# Patient Record
Sex: FEMALE | Race: WHITE | NOT HISPANIC OR LATINO | URBAN - METROPOLITAN AREA
[De-identification: names, ages, dates, MRNs, and addresses within clinical notes are randomized per-mention and may not be internally consistent; named-entity substitution may affect disease eponyms.]

---

## 2017-09-07 ENCOUNTER — INPATIENT (INPATIENT)
Facility: HOSPITAL | Age: 47
LOS: 2 days | Discharge: HOME | DRG: 761 | End: 2017-09-10
Attending: OBSTETRICS & GYNECOLOGY | Admitting: OBSTETRICS & GYNECOLOGY
Payer: COMMERCIAL

## 2017-09-07 VITALS
HEART RATE: 82 BPM | DIASTOLIC BLOOD PRESSURE: 66 MMHG | HEIGHT: 63.5 IN | WEIGHT: 195.11 LBS | SYSTOLIC BLOOD PRESSURE: 141 MMHG | OXYGEN SATURATION: 100 % | RESPIRATION RATE: 18 BRPM

## 2017-09-07 DIAGNOSIS — R10.9 UNSPECIFIED ABDOMINAL PAIN: ICD-10-CM

## 2017-09-07 DIAGNOSIS — N83.209 UNSPECIFIED OVARIAN CYST, UNSPECIFIED SIDE: ICD-10-CM

## 2017-09-07 DIAGNOSIS — N83.11 CORPUS LUTEUM CYST OF RIGHT OVARY: ICD-10-CM

## 2017-09-07 DIAGNOSIS — Z98.51 TUBAL LIGATION STATUS: Chronic | ICD-10-CM

## 2017-09-07 DIAGNOSIS — K80.20 CALCULUS OF GALLBLADDER WITHOUT CHOLECYSTITIS WITHOUT OBSTRUCTION: ICD-10-CM

## 2017-09-07 DIAGNOSIS — E28.2 POLYCYSTIC OVARIAN SYNDROME: ICD-10-CM

## 2017-09-07 DIAGNOSIS — Z98.890 OTHER SPECIFIED POSTPROCEDURAL STATES: ICD-10-CM

## 2017-09-07 DIAGNOSIS — N83.511 TORSION OF RIGHT OVARY AND OVARIAN PEDICLE: ICD-10-CM

## 2017-09-07 LAB
ALBUMIN SERPL ELPH-MCNC: 4 G/DL — SIGNIFICANT CHANGE UP (ref 3.5–5)
ALP SERPL-CCNC: 66 U/L — SIGNIFICANT CHANGE UP (ref 40–120)
ALT FLD-CCNC: 18 U/L DA — SIGNIFICANT CHANGE UP (ref 10–60)
ANION GAP SERPL CALC-SCNC: 9 MMOL/L — SIGNIFICANT CHANGE UP (ref 5–17)
APPEARANCE UR: CLEAR — SIGNIFICANT CHANGE UP
AST SERPL-CCNC: 15 U/L — SIGNIFICANT CHANGE UP (ref 10–40)
BASOPHILS # BLD AUTO: 0.1 K/UL — SIGNIFICANT CHANGE UP (ref 0–0.2)
BASOPHILS NFR BLD AUTO: 0.7 % — SIGNIFICANT CHANGE UP (ref 0–2)
BILIRUB SERPL-MCNC: 0.3 MG/DL — SIGNIFICANT CHANGE UP (ref 0.2–1.2)
BILIRUB UR-MCNC: NEGATIVE — SIGNIFICANT CHANGE UP
BUN SERPL-MCNC: 15 MG/DL — SIGNIFICANT CHANGE UP (ref 7–18)
CALCIUM SERPL-MCNC: 8.6 MG/DL — SIGNIFICANT CHANGE UP (ref 8.4–10.5)
CHLORIDE SERPL-SCNC: 106 MMOL/L — SIGNIFICANT CHANGE UP (ref 96–108)
CO2 SERPL-SCNC: 22 MMOL/L — SIGNIFICANT CHANGE UP (ref 22–31)
COLOR SPEC: YELLOW — SIGNIFICANT CHANGE UP
CREAT SERPL-MCNC: 0.76 MG/DL — SIGNIFICANT CHANGE UP (ref 0.5–1.3)
DIFF PNL FLD: ABNORMAL
EOSINOPHIL # BLD AUTO: 0 K/UL — SIGNIFICANT CHANGE UP (ref 0–0.5)
EOSINOPHIL NFR BLD AUTO: 0.2 % — SIGNIFICANT CHANGE UP (ref 0–6)
GLUCOSE SERPL-MCNC: 104 MG/DL — HIGH (ref 70–99)
GLUCOSE UR QL: NEGATIVE — SIGNIFICANT CHANGE UP
HCG SERPL-ACNC: <1 MIU/ML — SIGNIFICANT CHANGE UP
HCG UR QL: NEGATIVE — SIGNIFICANT CHANGE UP
HCT VFR BLD CALC: 43.9 % — SIGNIFICANT CHANGE UP (ref 34.5–45)
HGB BLD-MCNC: 14.2 G/DL — SIGNIFICANT CHANGE UP (ref 11.5–15.5)
KETONES UR-MCNC: ABNORMAL
LEUKOCYTE ESTERASE UR-ACNC: ABNORMAL
LIDOCAIN IGE QN: 136 U/L — SIGNIFICANT CHANGE UP (ref 73–393)
LYMPHOCYTES # BLD AUTO: 1.3 K/UL — SIGNIFICANT CHANGE UP (ref 1–3.3)
LYMPHOCYTES # BLD AUTO: 8.9 % — LOW (ref 13–44)
MCHC RBC-ENTMCNC: 26.5 PG — LOW (ref 27–34)
MCHC RBC-ENTMCNC: 32.4 GM/DL — SIGNIFICANT CHANGE UP (ref 32–36)
MCV RBC AUTO: 81.8 FL — SIGNIFICANT CHANGE UP (ref 80–100)
MONOCYTES # BLD AUTO: 0.4 K/UL — SIGNIFICANT CHANGE UP (ref 0–0.9)
MONOCYTES NFR BLD AUTO: 2.8 % — SIGNIFICANT CHANGE UP (ref 2–14)
NEUTROPHILS # BLD AUTO: 13.2 K/UL — HIGH (ref 1.8–7.4)
NEUTROPHILS NFR BLD AUTO: 87.4 % — HIGH (ref 43–77)
NITRITE UR-MCNC: NEGATIVE — SIGNIFICANT CHANGE UP
PH UR: 7 — SIGNIFICANT CHANGE UP (ref 5–8)
PLATELET # BLD AUTO: 204 K/UL — SIGNIFICANT CHANGE UP (ref 150–400)
POTASSIUM SERPL-MCNC: 3.8 MMOL/L — SIGNIFICANT CHANGE UP (ref 3.5–5.3)
POTASSIUM SERPL-SCNC: 3.8 MMOL/L — SIGNIFICANT CHANGE UP (ref 3.5–5.3)
PROT SERPL-MCNC: 7.4 G/DL — SIGNIFICANT CHANGE UP (ref 6–8.3)
PROT UR-MCNC: 30 MG/DL
RBC # BLD: 5.36 M/UL — HIGH (ref 3.8–5.2)
RBC # FLD: 13.7 % — SIGNIFICANT CHANGE UP (ref 10.3–14.5)
SODIUM SERPL-SCNC: 137 MMOL/L — SIGNIFICANT CHANGE UP (ref 135–145)
SP GR SPEC: 1.01 — SIGNIFICANT CHANGE UP (ref 1.01–1.02)
UROBILINOGEN FLD QL: NEGATIVE — SIGNIFICANT CHANGE UP
WBC # BLD: 15 K/UL — HIGH (ref 3.8–10.5)
WBC # FLD AUTO: 15 K/UL — HIGH (ref 3.8–10.5)

## 2017-09-07 PROCEDURE — 99285 EMERGENCY DEPT VISIT HI MDM: CPT

## 2017-09-07 RX ORDER — ONDANSETRON 8 MG/1
4 TABLET, FILM COATED ORAL ONCE
Qty: 0 | Refills: 0 | Status: COMPLETED | OUTPATIENT
Start: 2017-09-07 | End: 2017-09-07

## 2017-09-07 RX ORDER — HYDROMORPHONE HYDROCHLORIDE 2 MG/ML
2 INJECTION INTRAMUSCULAR; INTRAVENOUS; SUBCUTANEOUS EVERY 4 HOURS
Qty: 0 | Refills: 0 | Status: DISCONTINUED | OUTPATIENT
Start: 2017-09-07 | End: 2017-09-08

## 2017-09-07 RX ORDER — MORPHINE SULFATE 50 MG/1
4 CAPSULE, EXTENDED RELEASE ORAL ONCE
Qty: 0 | Refills: 0 | Status: DISCONTINUED | OUTPATIENT
Start: 2017-09-07 | End: 2017-09-07

## 2017-09-07 RX ORDER — SODIUM CHLORIDE 9 MG/ML
1000 INJECTION, SOLUTION INTRAVENOUS
Qty: 0 | Refills: 0 | Status: DISCONTINUED | OUTPATIENT
Start: 2017-09-07 | End: 2017-09-10

## 2017-09-07 RX ORDER — SODIUM CHLORIDE 9 MG/ML
1000 INJECTION INTRAMUSCULAR; INTRAVENOUS; SUBCUTANEOUS ONCE
Qty: 0 | Refills: 0 | Status: COMPLETED | OUTPATIENT
Start: 2017-09-07 | End: 2017-09-07

## 2017-09-07 RX ORDER — FENTANYL CITRATE 50 UG/ML
50 INJECTION INTRAVENOUS ONCE
Qty: 0 | Refills: 0 | Status: DISCONTINUED | OUTPATIENT
Start: 2017-09-07 | End: 2017-09-07

## 2017-09-07 RX ORDER — SIMETHICONE 80 MG/1
80 TABLET, CHEWABLE ORAL EVERY 6 HOURS
Qty: 0 | Refills: 0 | Status: DISCONTINUED | OUTPATIENT
Start: 2017-09-07 | End: 2017-09-10

## 2017-09-07 RX ORDER — KETOROLAC TROMETHAMINE 30 MG/ML
30 SYRINGE (ML) INJECTION ONCE
Qty: 0 | Refills: 0 | Status: DISCONTINUED | OUTPATIENT
Start: 2017-09-07 | End: 2017-09-07

## 2017-09-07 RX ORDER — ACETAMINOPHEN 500 MG
1000 TABLET ORAL ONCE
Qty: 0 | Refills: 0 | Status: COMPLETED | OUTPATIENT
Start: 2017-09-07 | End: 2017-09-07

## 2017-09-07 RX ORDER — MORPHINE SULFATE 50 MG/1
6 CAPSULE, EXTENDED RELEASE ORAL ONCE
Qty: 0 | Refills: 0 | Status: DISCONTINUED | OUTPATIENT
Start: 2017-09-07 | End: 2017-09-07

## 2017-09-07 RX ORDER — IBUPROFEN 200 MG
600 TABLET ORAL EVERY 6 HOURS
Qty: 0 | Refills: 0 | Status: DISCONTINUED | OUTPATIENT
Start: 2017-09-07 | End: 2017-09-07

## 2017-09-07 RX ORDER — IBUPROFEN 200 MG
800 TABLET ORAL EVERY 6 HOURS
Qty: 0 | Refills: 0 | Status: DISCONTINUED | OUTPATIENT
Start: 2017-09-07 | End: 2017-09-08

## 2017-09-07 RX ADMIN — MORPHINE SULFATE 4 MILLIGRAM(S): 50 CAPSULE, EXTENDED RELEASE ORAL at 16:52

## 2017-09-07 RX ADMIN — Medication 30 MILLIGRAM(S): at 14:12

## 2017-09-07 RX ADMIN — ONDANSETRON 4 MILLIGRAM(S): 8 TABLET, FILM COATED ORAL at 14:37

## 2017-09-07 RX ADMIN — SIMETHICONE 80 MILLIGRAM(S): 80 TABLET, CHEWABLE ORAL at 20:21

## 2017-09-07 RX ADMIN — SODIUM CHLORIDE 125 MILLILITER(S): 9 INJECTION, SOLUTION INTRAVENOUS at 19:44

## 2017-09-07 RX ADMIN — Medication 800 MILLIGRAM(S): at 21:38

## 2017-09-07 RX ADMIN — MORPHINE SULFATE 4 MILLIGRAM(S): 50 CAPSULE, EXTENDED RELEASE ORAL at 15:02

## 2017-09-07 RX ADMIN — Medication 800 MILLIGRAM(S): at 20:38

## 2017-09-07 RX ADMIN — MORPHINE SULFATE 4 MILLIGRAM(S): 50 CAPSULE, EXTENDED RELEASE ORAL at 15:04

## 2017-09-07 RX ADMIN — MORPHINE SULFATE 4 MILLIGRAM(S): 50 CAPSULE, EXTENDED RELEASE ORAL at 14:37

## 2017-09-07 RX ADMIN — SODIUM CHLORIDE 1000 MILLILITER(S): 9 INJECTION INTRAMUSCULAR; INTRAVENOUS; SUBCUTANEOUS at 14:12

## 2017-09-07 RX ADMIN — FENTANYL CITRATE 50 MICROGRAM(S): 50 INJECTION INTRAVENOUS at 18:03

## 2017-09-07 RX ADMIN — MORPHINE SULFATE 6 MILLIGRAM(S): 50 CAPSULE, EXTENDED RELEASE ORAL at 16:56

## 2017-09-07 RX ADMIN — SODIUM CHLORIDE 1000 MILLILITER(S): 9 INJECTION INTRAMUSCULAR; INTRAVENOUS; SUBCUTANEOUS at 18:03

## 2017-09-07 RX ADMIN — Medication 30 MILLIGRAM(S): at 15:04

## 2017-09-07 RX ADMIN — Medication 400 MILLIGRAM(S): at 19:41

## 2017-09-07 RX ADMIN — FENTANYL CITRATE 50 MICROGRAM(S): 50 INJECTION INTRAVENOUS at 17:33

## 2017-09-07 RX ADMIN — Medication 1000 MILLIGRAM(S): at 20:11

## 2017-09-07 RX ADMIN — MORPHINE SULFATE 6 MILLIGRAM(S): 50 CAPSULE, EXTENDED RELEASE ORAL at 17:26

## 2017-09-07 NOTE — H&P ADULT - HISTORY OF PRESENT ILLNESS
45yo female with h/o PCOS presents to ED c/o sharp sudden onset of RLQ pain since this morning.  States the pain is 10/10 and radiating to suprapubic area and left side and back.  Since arrival in ED, only temporary relief with morphine or toradol  Denies fever, chills, epigastric pain, nausea/vomiting, diarrhea, constipation.  Denies vaginal bleeding or discharge.    PMH: PCOS  PSH: CS x3  POBHx; CSx3  Pgyn: +PCOS not on meds, has private gyn in [place], seen in the past 6 months  Meds: none  Allergies: denies    PE: Pt appears uncomfortable, VSS  Abd: soft, NT/ND; no guarding or rebound, no acute abdomen observed however pt had recieved multiple doses of morphine/toradol  Ext: soft NT/ no edema  SVE: deferred    CBC 15/14.2/43.9/204  UA:Urine Microscopic-Add On (NC) (09.07.17 @ 14:30)    Bacteria: Many /HPF    White Blood Cell - Urine: 3-5 /HPF    Red Blood Cell - Urine: 10-25 /HPF    Comment - Urine: many mucous threads    Epithelial Cells: Moderate    d/w Dr. Chester

## 2017-09-07 NOTE — ED PROVIDER NOTE - CHPI ED SYMPTOMS NEG
no fever, no chills, no shortness of breath, no cough, no chest pain, no palpitations, no vomiting, no diarrhea, no dysuria, no urinary frequency, no hematuria, no vaginal discharge, no vaginal bleeding

## 2017-09-07 NOTE — ED PROVIDER NOTE - OBJECTIVE STATEMENT
45 y/o F pt with PMHx of known gallstones (has been asymptomatic), and PSHx Tubal ligation, presents to ED c/o sudden onset of sharp constant right lower abd pain that radiates to her back x this morning 0900. Pt endorses associating sx of nausea. Pt denies fever, chills, shortness of breath, cough, chest pain, palpitations, vomiting, diarrhea, dysuria, urinary frequency, hematuria, vaginal discharge, vaginal bleeding, or any other complaints. NKDA. LMP: 3 weeks.

## 2017-09-07 NOTE — ED PROVIDER NOTE - PROGRESS NOTE DETAILS
patient still with significant right lower abdominal pain. Rt ovary not visualized. high suspicion for ovarian torsion. GYN consulted. awaiting CT abdomen to r/o apendicitis. FRANKY: Pt was signed out to me pending CT abd to r/o appendicitis vs ovarian pathology. CT shows no appendicitis. Free fluid in pelvis and upper ab, possible rupture cyst. Pain improved with fentanyl, now 1/10. Pending OBGYN consult and recs. FRANKY: OBGYN will admit pt for pain control. Dr DONNY Nicholson

## 2017-09-08 DIAGNOSIS — R10.9 UNSPECIFIED ABDOMINAL PAIN: ICD-10-CM

## 2017-09-08 DIAGNOSIS — K80.20 CALCULUS OF GALLBLADDER WITHOUT CHOLECYSTITIS WITHOUT OBSTRUCTION: ICD-10-CM

## 2017-09-08 LAB
BASOPHILS # BLD AUTO: 0 K/UL — SIGNIFICANT CHANGE UP (ref 0–0.2)
BASOPHILS # BLD AUTO: 0.1 K/UL — SIGNIFICANT CHANGE UP (ref 0–0.2)
BASOPHILS NFR BLD AUTO: 0.4 % — SIGNIFICANT CHANGE UP (ref 0–2)
BASOPHILS NFR BLD AUTO: 0.5 % — SIGNIFICANT CHANGE UP (ref 0–2)
EOSINOPHIL # BLD AUTO: 0 K/UL — SIGNIFICANT CHANGE UP (ref 0–0.5)
EOSINOPHIL # BLD AUTO: 0 K/UL — SIGNIFICANT CHANGE UP (ref 0–0.5)
EOSINOPHIL NFR BLD AUTO: 0.1 % — SIGNIFICANT CHANGE UP (ref 0–6)
EOSINOPHIL NFR BLD AUTO: 0.2 % — SIGNIFICANT CHANGE UP (ref 0–6)
HCT VFR BLD CALC: 19.8 % — CRITICAL LOW (ref 34.5–45)
HCT VFR BLD CALC: 31.7 % — LOW (ref 34.5–45)
HCT VFR BLD CALC: 33 % — LOW (ref 34.5–45)
HGB BLD-MCNC: 10.4 G/DL — LOW (ref 11.5–15.5)
HGB BLD-MCNC: 11 G/DL — LOW (ref 11.5–15.5)
HGB BLD-MCNC: 7 G/DL — CRITICAL LOW (ref 11.5–15.5)
LYMPHOCYTES # BLD AUTO: 1.2 K/UL — SIGNIFICANT CHANGE UP (ref 1–3.3)
LYMPHOCYTES # BLD AUTO: 1.7 K/UL — SIGNIFICANT CHANGE UP (ref 1–3.3)
LYMPHOCYTES # BLD AUTO: 11.1 % — LOW (ref 13–44)
LYMPHOCYTES # BLD AUTO: 11.8 % — LOW (ref 13–44)
MCHC RBC-ENTMCNC: 26.3 PG — LOW (ref 27–34)
MCHC RBC-ENTMCNC: 27.1 PG — SIGNIFICANT CHANGE UP (ref 27–34)
MCHC RBC-ENTMCNC: 29.6 PG — SIGNIFICANT CHANGE UP (ref 27–34)
MCHC RBC-ENTMCNC: 32.7 GM/DL — SIGNIFICANT CHANGE UP (ref 32–36)
MCHC RBC-ENTMCNC: 33.4 GM/DL — SIGNIFICANT CHANGE UP (ref 32–36)
MCHC RBC-ENTMCNC: 35.2 GM/DL — SIGNIFICANT CHANGE UP (ref 32–36)
MCV RBC AUTO: 80.4 FL — SIGNIFICANT CHANGE UP (ref 80–100)
MCV RBC AUTO: 81.3 FL — SIGNIFICANT CHANGE UP (ref 80–100)
MCV RBC AUTO: 84.2 FL — SIGNIFICANT CHANGE UP (ref 80–100)
MONOCYTES # BLD AUTO: 0.7 K/UL — SIGNIFICANT CHANGE UP (ref 0–0.9)
MONOCYTES # BLD AUTO: 0.8 K/UL — SIGNIFICANT CHANGE UP (ref 0–0.9)
MONOCYTES NFR BLD AUTO: 5.1 % — SIGNIFICANT CHANGE UP (ref 2–14)
MONOCYTES NFR BLD AUTO: 6.9 % — SIGNIFICANT CHANGE UP (ref 2–14)
NEUTROPHILS # BLD AUTO: 12.5 K/UL — HIGH (ref 1.8–7.4)
NEUTROPHILS # BLD AUTO: 7.9 K/UL — HIGH (ref 1.8–7.4)
NEUTROPHILS NFR BLD AUTO: 80.6 % — HIGH (ref 43–77)
NEUTROPHILS NFR BLD AUTO: 83.4 % — HIGH (ref 43–77)
PLATELET # BLD AUTO: 178 K/UL — SIGNIFICANT CHANGE UP (ref 150–400)
PLATELET # BLD AUTO: 182 K/UL — SIGNIFICANT CHANGE UP (ref 150–400)
PLATELET # BLD AUTO: 186 K/UL — SIGNIFICANT CHANGE UP (ref 150–400)
RBC # BLD: 2.35 M/UL — LOW (ref 3.8–5.2)
RBC # BLD: 3.95 M/UL — SIGNIFICANT CHANGE UP (ref 3.8–5.2)
RBC # BLD: 4.05 M/UL — SIGNIFICANT CHANGE UP (ref 3.8–5.2)
RBC # FLD: 11.3 % — SIGNIFICANT CHANGE UP (ref 10.3–14.5)
RBC # FLD: 13.7 % — SIGNIFICANT CHANGE UP (ref 10.3–14.5)
RBC # FLD: 14.2 % — SIGNIFICANT CHANGE UP (ref 10.3–14.5)
WBC # BLD: 15 K/UL — HIGH (ref 3.8–10.5)
WBC # BLD: 17 K/UL — HIGH (ref 3.8–10.5)
WBC # BLD: 9.8 K/UL — SIGNIFICANT CHANGE UP (ref 3.8–10.5)
WBC # FLD AUTO: 15 K/UL — HIGH (ref 3.8–10.5)
WBC # FLD AUTO: 17 K/UL — HIGH (ref 3.8–10.5)
WBC # FLD AUTO: 9.8 K/UL — SIGNIFICANT CHANGE UP (ref 3.8–10.5)

## 2017-09-08 PROCEDURE — 76705 ECHO EXAM OF ABDOMEN: CPT | Mod: 26

## 2017-09-08 RX ORDER — HYDROMORPHONE HYDROCHLORIDE 2 MG/ML
1 INJECTION INTRAMUSCULAR; INTRAVENOUS; SUBCUTANEOUS EVERY 4 HOURS
Qty: 0 | Refills: 0 | Status: DISCONTINUED | OUTPATIENT
Start: 2017-09-08 | End: 2017-09-10

## 2017-09-08 RX ORDER — KETOROLAC TROMETHAMINE 30 MG/ML
30 SYRINGE (ML) INJECTION EVERY 6 HOURS
Qty: 0 | Refills: 0 | Status: DISCONTINUED | OUTPATIENT
Start: 2017-09-08 | End: 2017-09-09

## 2017-09-08 RX ORDER — ACETAMINOPHEN 500 MG
1000 TABLET ORAL ONCE
Qty: 0 | Refills: 0 | Status: COMPLETED | OUTPATIENT
Start: 2017-09-08 | End: 2017-09-09

## 2017-09-08 RX ORDER — ONDANSETRON 8 MG/1
4 TABLET, FILM COATED ORAL EVERY 6 HOURS
Qty: 0 | Refills: 0 | Status: COMPLETED | OUTPATIENT
Start: 2017-09-08 | End: 2017-09-08

## 2017-09-08 RX ORDER — KETOROLAC TROMETHAMINE 30 MG/ML
30 SYRINGE (ML) INJECTION EVERY 6 HOURS
Qty: 0 | Refills: 0 | Status: DISCONTINUED | OUTPATIENT
Start: 2017-09-08 | End: 2017-09-08

## 2017-09-08 RX ORDER — HYDROMORPHONE HYDROCHLORIDE 2 MG/ML
0.5 INJECTION INTRAMUSCULAR; INTRAVENOUS; SUBCUTANEOUS
Qty: 0 | Refills: 0 | Status: DISCONTINUED | OUTPATIENT
Start: 2017-09-08 | End: 2017-09-09

## 2017-09-08 RX ORDER — HYDROMORPHONE HYDROCHLORIDE 2 MG/ML
1 INJECTION INTRAMUSCULAR; INTRAVENOUS; SUBCUTANEOUS EVERY 4 HOURS
Qty: 0 | Refills: 0 | Status: DISCONTINUED | OUTPATIENT
Start: 2017-09-08 | End: 2017-09-08

## 2017-09-08 RX ADMIN — ONDANSETRON 4 MILLIGRAM(S): 8 TABLET, FILM COATED ORAL at 03:27

## 2017-09-08 RX ADMIN — HYDROMORPHONE HYDROCHLORIDE 1 MILLIGRAM(S): 2 INJECTION INTRAMUSCULAR; INTRAVENOUS; SUBCUTANEOUS at 06:45

## 2017-09-08 RX ADMIN — HYDROMORPHONE HYDROCHLORIDE 1 MILLIGRAM(S): 2 INJECTION INTRAMUSCULAR; INTRAVENOUS; SUBCUTANEOUS at 02:34

## 2017-09-08 RX ADMIN — HYDROMORPHONE HYDROCHLORIDE 1 MILLIGRAM(S): 2 INJECTION INTRAMUSCULAR; INTRAVENOUS; SUBCUTANEOUS at 15:07

## 2017-09-08 RX ADMIN — Medication 30 MILLIGRAM(S): at 12:17

## 2017-09-08 RX ADMIN — HYDROMORPHONE HYDROCHLORIDE 1 MILLIGRAM(S): 2 INJECTION INTRAMUSCULAR; INTRAVENOUS; SUBCUTANEOUS at 15:45

## 2017-09-08 RX ADMIN — SIMETHICONE 80 MILLIGRAM(S): 80 TABLET, CHEWABLE ORAL at 00:13

## 2017-09-08 RX ADMIN — Medication 30 MILLIGRAM(S): at 01:43

## 2017-09-08 RX ADMIN — HYDROMORPHONE HYDROCHLORIDE 1 MILLIGRAM(S): 2 INJECTION INTRAMUSCULAR; INTRAVENOUS; SUBCUTANEOUS at 03:00

## 2017-09-08 RX ADMIN — HYDROMORPHONE HYDROCHLORIDE 1 MILLIGRAM(S): 2 INJECTION INTRAMUSCULAR; INTRAVENOUS; SUBCUTANEOUS at 07:00

## 2017-09-08 RX ADMIN — Medication 30 MILLIGRAM(S): at 01:57

## 2017-09-08 RX ADMIN — SODIUM CHLORIDE 125 MILLILITER(S): 9 INJECTION, SOLUTION INTRAVENOUS at 06:26

## 2017-09-08 RX ADMIN — SIMETHICONE 80 MILLIGRAM(S): 80 TABLET, CHEWABLE ORAL at 11:21

## 2017-09-08 RX ADMIN — SIMETHICONE 80 MILLIGRAM(S): 80 TABLET, CHEWABLE ORAL at 05:26

## 2017-09-08 RX ADMIN — Medication 30 MILLIGRAM(S): at 21:12

## 2017-09-08 RX ADMIN — Medication 30 MILLIGRAM(S): at 12:50

## 2017-09-08 RX ADMIN — SIMETHICONE 80 MILLIGRAM(S): 80 TABLET, CHEWABLE ORAL at 17:18

## 2017-09-08 NOTE — CHART NOTE - NSCHARTNOTEFT_GEN_A_CORE
Pt seen at bedside, still c/o of 8/10 RLQ pain, Denies chest pain, SOB, palpitations, headache, dizziness, weakness, n/v, fever or chills.   VSS  T(C): 36.4 (09-08-17 @ 01:18), Max: 37.1 (09-08-17 @ 00:31)  HR: 88 (09-08-17 @ 01:18) (82 - 89)  BP: 119/61 (09-08-17 @ 01:18) (108/41 - 141/66)  RR: 17 (09-08-17 @ 01:18) (16 - 18)  SpO2: 99% (09-08-17 @ 01:18) (99% - 100%)    gen: NAD, a&ox3  cardiac: RRR  abd: soft, non distended, +RLQ tenderness, no rebound, no guarding  pelvic: no vaginal bleeding                        7.0    9.8   )-----------( 178      ( 08 Sep 2017 02:22 )             19.8       a/p HD 1, admitted for RLQ pain likely ruptured right ovarian cyst  - repeat cbc & type & screen   - 2 units of PRBC ordered  - patient added on for the OR for first case in AM  d/w Dr. Chester Pt seen at bedside, still c/o of 8/10 RLQ pain, Denies chest pain, SOB, palpitations, headache, dizziness, weakness, n/v, fever or chills.   VSS  T(C): 36.4 (09-08-17 @ 01:18), Max: 37.1 (09-08-17 @ 00:31)  HR: 88 (09-08-17 @ 01:18) (82 - 89)  BP: 119/61 (09-08-17 @ 01:18) (108/41 - 141/66)  RR: 17 (09-08-17 @ 01:18) (16 - 18)  SpO2: 99% (09-08-17 @ 01:18) (99% - 100%)    gen: NAD, a&ox3  cardiac: RRR  abd: soft, non distended, +RLQ tenderness, no rebound, no guarding  pelvic: no vaginal bleeding                        7.0    9.8   )-----------( 178      ( 08 Sep 2017 02:22 )             19.8       a/p HD 1, admitted for RLQ pain likely ruptured right ovarian cyst  - repeat cbc & type & screen   - continue close monitoring  d/w Dr. Chester    *Addendum  CBC at 2:22 was drawn from the same arm as where the IV is  repeat cbc:                        11.0   17.0  )-----------( 182      ( 08 Sep 2017 03:37 )             33.0    repeat cbc at 6am  does not require any blood transfusion at this moment  will continue to monitor  recommend RUQ ultrasound     d/w Dr. Chester

## 2017-09-08 NOTE — PROGRESS NOTE ADULT - SUBJECTIVE AND OBJECTIVE BOX
d/w  Patient evaluated at bedside, states she continues to have 8/10 pain, though the medications "take the edge off". Currently NPO for possible procedure and sonogram.   Voiding without diffficulty; +flatus, -BM since admission  Denies fever/chills, nausea/vomiting, headache, dizziness, chest pains, shortness of breath, palpitations    Vital Signs Last 24 Hrs  T(C): 36.8 (08 Sep 2017 05:53), Max: 37.1 (08 Sep 2017 00:31)  T(F): 98.3 (08 Sep 2017 05:53), Max: 98.7 (08 Sep 2017 00:31)  HR: 88 (08 Sep 2017 05:53) (82 - 89)  BP: 112/59 (08 Sep 2017 05:53) (108/41 - 141/66)  BP(mean): --  RR: 16 (08 Sep 2017 05:53) (16 - 18)  SpO2: 97% (08 Sep 2017 05:53) (97% - 100%)    PE: Pt laying in bed, NAD, AAOx3  Chest: CTA bilaterally, no W/R/R  Cardiac: RRR  Abd: soft; NT; no guarding or rebound; +BS x4 quad;   Gyn: no VB  Ext: soft, NT; DTRs 2+ bilaterally; no worsening edema                          11.0   17.0  )-----------( 182      ( 08 Sep 2017 03:37 )             33.0     09-07    137  |  106  |  15  ----------------------------<  104<H>  3.8   |  22  |  0.76    Ca    8.6      07 Sep 2017 14:09    TPro  7.4  /  Alb  4.0  /  TBili  0.3  /  DBili  x   /  AST  15  /  ALT  18  /  AlkPhos  66  09-07      D/w Dr. Rajput

## 2017-09-08 NOTE — CHART NOTE - NSCHARTNOTEFT_GEN_A_CORE
Multiple attempts made to contact Dr. Rowland to discuss laboratory results and patient status. Message left at 920am and 1140am.

## 2017-09-08 NOTE — CONSULT NOTE ADULT - SUBJECTIVE AND OBJECTIVE BOX
Surgery:  Consultation Note    Patient is a 46y old  Female who presents with a chief complaint of right lower quadrant pain since this morning (07 Sep 2017 19:11)      HPI:  45 y/o female with PMH PCOS, PSH tubal ligation c/o severe sudden onset RLQ pain that started yesterday morning prior to which she had coffee and a light breakfast. Pt states the pain began as a dull ache in her suprapubic area which progressed to sharp stabbing pain that was centralized to the R abdomen and radiating to the b/l flank, lower back and R shoulder. Pt c/o pelvic pain with urinating. Pt rates the pain as over a 10/10 in severity and about a 2/10 after pain medications. Pt states that pain is slightly improved today and more colicky in nature, however still severe at times without pain medication. Pt admits to several episodes of diaphoresis and dizziness since the onset of symptoms. Admits to feeling nauseous but denies any vomiting, diarrhea, constipation (last BM yesterday), fever, SOB, palpitations, dysuria, frequency, hematuria, foul smelling urine. Pt was diagnosed with 7mm gallstone 2 yrs ago which she has never received treatment for and has also been asymptomatic.  Pt's LMP was ~ 3 wks ago and she is not currently sexually active.     PAST MEDICAL & SURGICAL HISTORY:  PCOS (polycystic ovarian syndrome)  Cholelithiasis   delivery delivered  H/O tubal ligation    Allergies    No Known Allergies    Intolerances    MEDICATIONS  (STANDING):  lactated ringers. 1000 milliLiter(s) (125 mL/Hr) IV Continuous <Continuous>  simethicone 80 milliGRAM(s) Chew every 6 hours    MEDICATIONS  (PRN):  HYDROmorphone  Injectable 1 milliGRAM(s) IV Push every 4 hours PRN Severe Pain (7 - 10)  ketorolac   Injectable 30 milliGRAM(s) IV Push every 6 hours PRN Moderate Pain (4 - 6)      Vital Signs Last 24 Hrs  T(C): 36.8 (08 Sep 2017 05:53), Max: 37.1 (08 Sep 2017 00:31)  T(F): 98.3 (08 Sep 2017 05:53), Max: 98.7 (08 Sep 2017 00:31)  HR: 88 (08 Sep 2017 05:53) (82 - 89)  BP: 112/59 (08 Sep 2017 05:53) (108/41 - 132/63)  BP(mean): --  RR: 16 (08 Sep 2017 05:53) (16 - 18)  SpO2: 97% (08 Sep 2017 05:53) (97% - 100%)    Physical Exam:    Gen:  AAO x 3, laying comfortably in bed in no acute distress  Abd: + distended, +BS in all 4 quadrants,  soft, +tenderness to palpation in all 4 quadrants, more severe in RUQ and RLQ, neg Bar's sign, no guarding, rigidity  Back: no CVA tenderness b/l      Labs:                          10.4   15.0  )-----------( 186      ( 08 Sep 2017 08:52 )             31.7         137  |  106  |  15  ----------------------------<  104<H>  3.8   |  22  |  0.76    Ca    8.6      07 Sep 2017 14:09    TPro  7.4  /  Alb  4.0  /  TBili  0.3  /  DBili  x   /  AST  15  /  ALT  18  /  AlkPhos  66        Urinalysis Basic - ( 07 Sep 2017 14:30 )    Color: Yellow / Appearance: Clear / S.010 / pH: x  Gluc: x / Ketone: Small  / Bili: Negative / Urobili: Negative   Blood: x / Protein: 30 mg/dL / Nitrite: Negative   Leuk Esterase: Trace / RBC: 10-25 /HPF / WBC 3-5 /HPF   Sq Epi: x / Non Sq Epi: Moderate / Bacteria: Many /HPF      Radiological Exams:  < from: CT Abdomen and Pelvis w/ Oral Cont and w/ IV Cont (17 @ 17:55) >    EXAM:  CT ABDOMEN AND PELVIS OC IC                            PROCEDURE DATE:  2017          INTERPRETATION:  CT ABDOMEN AND PELVIS WITH IV CONTRAST    CLINICAL STATEMENT: RLQ pain.    COMPARISON: None.    TECHNIQUE: CT scan of the abdomen and pelvis was performed with IV,   without oral contrast. Multiplanar reformations were made.    FINDINGS:  Mild bibasilar subsegmental atelectasis. No pleural effusion.    There is mild complex ascites in both upper quadrants, tracking down the   paracolic gutters into the pelvis.    The liver, gallbladder, pancreas, spleen, adrenal glands and kidneys are   unremarkable.    Evaluation of the GI tract is limited without oral contrast. GI tract is   unobstructed. Appendix is unremarkable. No free air.    No bulky adenopathy. Normal caliber abdominal aorta.    Urinary bladder is unremarkable. Uterus appears unremarkable. Left adnexa   is heterogeneous and enlarged. Right adnexa also appears heterogeneous.   Small amount of complex free pelvic fluid.    Degenerative changes in the spine.    IMPRESSION:  No CT evidence of appendicitis.    Heterogeneous, enlarged left adnexa. Right adnexa also appears   heterogeneous. Small amount of complex free pelvic fluid, and mild   complex ascites in the upper abdomen. Diagnostic considerations include   rupture of an ovarian hemorrhagic cyst; clinical correlation is   recommended.                MELVA TAVARES M.D., ATTENDING RADIOLOGIST  This document has been electronically signed. Sep  7 2017  6:02PM              < end of copied text >    < from: US Hepatic & Pancreatic (17 @ 09:23) >  EXAM:  US LIVER AND PANCREAS                            PROCEDURE DATE:  2017          INTERPRETATION:  EXAM: US LIVER AND PANCREAS  INDICATION: abdominal pain hx of gallstones.  COMPARISON: None.    TECHNIQUE: Grayscale ultrasound of the right upper quadrant was performed.    FINDINGS:  Liver: Normal echogenicity and echotexture. No focal hepatic mass is   demonstrated. Measures 18.1 cm in craniocaudal span. Portal and hepatic   veins are patent.    Bile ducts: No intra or extrahepatic biliary ductal dilatation. Common   duct = 7 mm, top normal in size.    Gallbladder: 1.7 cm gallstone. No pericholecystic fluid, or gallbladder   wall thickening. No sonographic Bar's sign was elicited by the   sonographer.    Pancreas: Visualized pancreatic head is unremarkable. The pancreatic body   and tail are obscured by bowel gas.     Right kidney: Measures 11.1 x 4.3 x 4.3 cm. No hydronephrosis.    Peritoneum: No ascites.    Proximal Aorta & IVC: Visualized abdominal aorta and IVC are grossly   unremarkable.    IMPRESSION:   Cholelithiasis. No sonographic evidence of acute cholecystitis.      < end of copied text >

## 2017-09-08 NOTE — PROGRESS NOTE ADULT - ASSESSMENT
47yo female admitted for pain management suspected ruptured ovarian cyst vs cholelithiasis, leukocytosis

## 2017-09-09 DIAGNOSIS — K80.20 CALCULUS OF GALLBLADDER WITHOUT CHOLECYSTITIS WITHOUT OBSTRUCTION: ICD-10-CM

## 2017-09-09 LAB
ANION GAP SERPL CALC-SCNC: 6 MMOL/L — SIGNIFICANT CHANGE UP (ref 5–17)
BASOPHILS # BLD AUTO: 0.1 K/UL — SIGNIFICANT CHANGE UP (ref 0–0.2)
BASOPHILS # BLD AUTO: 0.1 K/UL — SIGNIFICANT CHANGE UP (ref 0–0.2)
BASOPHILS NFR BLD AUTO: 0.6 % — SIGNIFICANT CHANGE UP (ref 0–2)
BASOPHILS NFR BLD AUTO: 0.6 % — SIGNIFICANT CHANGE UP (ref 0–2)
BUN SERPL-MCNC: 12 MG/DL — SIGNIFICANT CHANGE UP (ref 7–18)
CALCIUM SERPL-MCNC: 7.7 MG/DL — LOW (ref 8.4–10.5)
CHLORIDE SERPL-SCNC: 110 MMOL/L — HIGH (ref 96–108)
CO2 SERPL-SCNC: 27 MMOL/L — SIGNIFICANT CHANGE UP (ref 22–31)
CREAT SERPL-MCNC: 0.62 MG/DL — SIGNIFICANT CHANGE UP (ref 0.5–1.3)
EOSINOPHIL # BLD AUTO: 0.1 K/UL — SIGNIFICANT CHANGE UP (ref 0–0.5)
EOSINOPHIL # BLD AUTO: 0.1 K/UL — SIGNIFICANT CHANGE UP (ref 0–0.5)
EOSINOPHIL NFR BLD AUTO: 0.4 % — SIGNIFICANT CHANGE UP (ref 0–6)
EOSINOPHIL NFR BLD AUTO: 0.7 % — SIGNIFICANT CHANGE UP (ref 0–6)
GLUCOSE SERPL-MCNC: 98 MG/DL — SIGNIFICANT CHANGE UP (ref 70–99)
HCT VFR BLD CALC: 26.5 % — LOW (ref 34.5–45)
HCT VFR BLD CALC: 26.8 % — LOW (ref 34.5–45)
HCT VFR BLD CALC: 28 % — LOW (ref 34.5–45)
HGB BLD-MCNC: 8.5 G/DL — LOW (ref 11.5–15.5)
HGB BLD-MCNC: 8.5 G/DL — LOW (ref 11.5–15.5)
HGB BLD-MCNC: 8.9 G/DL — LOW (ref 11.5–15.5)
LYMPHOCYTES # BLD AUTO: 1.9 K/UL — SIGNIFICANT CHANGE UP (ref 1–3.3)
LYMPHOCYTES # BLD AUTO: 15.6 % — SIGNIFICANT CHANGE UP (ref 13–44)
LYMPHOCYTES # BLD AUTO: 15.9 % — SIGNIFICANT CHANGE UP (ref 13–44)
LYMPHOCYTES # BLD AUTO: 2.1 K/UL — SIGNIFICANT CHANGE UP (ref 1–3.3)
MCHC RBC-ENTMCNC: 25.8 PG — LOW (ref 27–34)
MCHC RBC-ENTMCNC: 25.9 PG — LOW (ref 27–34)
MCHC RBC-ENTMCNC: 26 PG — LOW (ref 27–34)
MCHC RBC-ENTMCNC: 31.8 GM/DL — LOW (ref 32–36)
MCHC RBC-ENTMCNC: 31.8 GM/DL — LOW (ref 32–36)
MCHC RBC-ENTMCNC: 31.9 GM/DL — LOW (ref 32–36)
MCV RBC AUTO: 81 FL — SIGNIFICANT CHANGE UP (ref 80–100)
MCV RBC AUTO: 81.4 FL — SIGNIFICANT CHANGE UP (ref 80–100)
MCV RBC AUTO: 81.6 FL — SIGNIFICANT CHANGE UP (ref 80–100)
MONOCYTES # BLD AUTO: 0.9 K/UL — SIGNIFICANT CHANGE UP (ref 0–0.9)
MONOCYTES # BLD AUTO: 0.9 K/UL — SIGNIFICANT CHANGE UP (ref 0–0.9)
MONOCYTES NFR BLD AUTO: 7.1 % — SIGNIFICANT CHANGE UP (ref 2–14)
MONOCYTES NFR BLD AUTO: 7.7 % — SIGNIFICANT CHANGE UP (ref 2–14)
NEUTROPHILS # BLD AUTO: 10.2 K/UL — HIGH (ref 1.8–7.4)
NEUTROPHILS # BLD AUTO: 8.9 K/UL — HIGH (ref 1.8–7.4)
NEUTROPHILS NFR BLD AUTO: 75.1 % — SIGNIFICANT CHANGE UP (ref 43–77)
NEUTROPHILS NFR BLD AUTO: 76.3 % — SIGNIFICANT CHANGE UP (ref 43–77)
PLATELET # BLD AUTO: 148 K/UL — LOW (ref 150–400)
PLATELET # BLD AUTO: 149 K/UL — LOW (ref 150–400)
PLATELET # BLD AUTO: 177 K/UL — SIGNIFICANT CHANGE UP (ref 150–400)
POTASSIUM SERPL-MCNC: 3.6 MMOL/L — SIGNIFICANT CHANGE UP (ref 3.5–5.3)
POTASSIUM SERPL-SCNC: 3.6 MMOL/L — SIGNIFICANT CHANGE UP (ref 3.5–5.3)
RBC # BLD: 3.26 M/UL — LOW (ref 3.8–5.2)
RBC # BLD: 3.3 M/UL — LOW (ref 3.8–5.2)
RBC # BLD: 3.45 M/UL — LOW (ref 3.8–5.2)
RBC # FLD: 14.2 % — SIGNIFICANT CHANGE UP (ref 10.3–14.5)
RBC # FLD: 14.3 % — SIGNIFICANT CHANGE UP (ref 10.3–14.5)
RBC # FLD: 14.5 % — SIGNIFICANT CHANGE UP (ref 10.3–14.5)
SODIUM SERPL-SCNC: 143 MMOL/L — SIGNIFICANT CHANGE UP (ref 135–145)
WBC # BLD: 10.9 K/UL — HIGH (ref 3.8–10.5)
WBC # BLD: 11.8 K/UL — HIGH (ref 3.8–10.5)
WBC # BLD: 13.3 K/UL — HIGH (ref 3.8–10.5)
WBC # FLD AUTO: 10.9 K/UL — HIGH (ref 3.8–10.5)
WBC # FLD AUTO: 11.8 K/UL — HIGH (ref 3.8–10.5)
WBC # FLD AUTO: 13.3 K/UL — HIGH (ref 3.8–10.5)

## 2017-09-09 RX ORDER — OXYCODONE AND ACETAMINOPHEN 5; 325 MG/1; MG/1
2 TABLET ORAL EVERY 6 HOURS
Qty: 0 | Refills: 0 | Status: DISCONTINUED | OUTPATIENT
Start: 2017-09-09 | End: 2017-09-10

## 2017-09-09 RX ORDER — OXYCODONE AND ACETAMINOPHEN 5; 325 MG/1; MG/1
1 TABLET ORAL EVERY 4 HOURS
Qty: 0 | Refills: 0 | Status: DISCONTINUED | OUTPATIENT
Start: 2017-09-09 | End: 2017-09-10

## 2017-09-09 RX ADMIN — HYDROMORPHONE HYDROCHLORIDE 1 MILLIGRAM(S): 2 INJECTION INTRAMUSCULAR; INTRAVENOUS; SUBCUTANEOUS at 11:00

## 2017-09-09 RX ADMIN — SIMETHICONE 80 MILLIGRAM(S): 80 TABLET, CHEWABLE ORAL at 06:00

## 2017-09-09 RX ADMIN — SIMETHICONE 80 MILLIGRAM(S): 80 TABLET, CHEWABLE ORAL at 00:00

## 2017-09-09 RX ADMIN — SODIUM CHLORIDE 125 MILLILITER(S): 9 INJECTION, SOLUTION INTRAVENOUS at 23:35

## 2017-09-09 RX ADMIN — OXYCODONE AND ACETAMINOPHEN 2 TABLET(S): 5; 325 TABLET ORAL at 18:40

## 2017-09-09 RX ADMIN — SIMETHICONE 80 MILLIGRAM(S): 80 TABLET, CHEWABLE ORAL at 23:35

## 2017-09-09 RX ADMIN — SIMETHICONE 80 MILLIGRAM(S): 80 TABLET, CHEWABLE ORAL at 17:16

## 2017-09-09 RX ADMIN — HYDROMORPHONE HYDROCHLORIDE 1 MILLIGRAM(S): 2 INJECTION INTRAMUSCULAR; INTRAVENOUS; SUBCUTANEOUS at 11:30

## 2017-09-09 RX ADMIN — SIMETHICONE 80 MILLIGRAM(S): 80 TABLET, CHEWABLE ORAL at 11:51

## 2017-09-09 RX ADMIN — OXYCODONE AND ACETAMINOPHEN 2 TABLET(S): 5; 325 TABLET ORAL at 19:09

## 2017-09-09 RX ADMIN — OXYCODONE AND ACETAMINOPHEN 1 TABLET(S): 5; 325 TABLET ORAL at 23:35

## 2017-09-09 RX ADMIN — Medication 1000 MILLIGRAM(S): at 10:00

## 2017-09-09 RX ADMIN — SODIUM CHLORIDE 125 MILLILITER(S): 9 INJECTION, SOLUTION INTRAVENOUS at 06:00

## 2017-09-09 RX ADMIN — Medication 400 MILLIGRAM(S): at 09:30

## 2017-09-09 NOTE — PROGRESS NOTE ADULT - SUBJECTIVE AND OBJECTIVE BOX
45 y/o female with ruptures ovarian cyst, and cholelithiasis. Patient has no radiologic signs of acute cholecystitis. Patient examined at bedside, states that pain is improved, denies votimiting, minimal nausea.   Currently NPO   T(F): 97.5 (09-09-17 @ 11:32), Max: 99.1 (09-08-17 @ 21:48)  HR: 96 (09-09-17 @ 11:32) (96 - 97)  BP: 103/49 (09-09-17 @ 11:32) (103/49 - 123/66)  RR: 16 (09-09-17 @ 11:32) (16 - 17)  SpO2: 96% (09-09-17 @ 11:32) (96% - 100%)      Physical Exam  General: AAOx3, No acute distress  Skin: No jaundice, no icterus  Abdomen: soft, nontender, nondistended, no rebound tenderness, no guarding, no palpable masses, no pearl sign   : Normal external genitalia  Extremities: non edematous, no calf pain bilaterally

## 2017-09-09 NOTE — PROGRESS NOTE ADULT - ASSESSMENT
A/P: HD 3 admittion for RLQ pain, secondary to suspected Right ruptured ovarian cyst  - repeat cbc stat  - as per surgery team, no need for HIDA scan at this time as patient had known hx of gallstones and no focal RUQ pain  - keep NPO   d/w Dr. Olanescu

## 2017-09-09 NOTE — PROGRESS NOTE ADULT - SUBJECTIVE AND OBJECTIVE BOX
Patient seen at bedside resting comfortably offers no events overnight. Patient reports that the pain is improving, however now has not had a bowel movement in 3 days. + Ambulation, voiding without difficulty, + flatus occasionally . Denies HA, CP, SOB, N/V/D,  no bm; dizziness, weakness palpitations, worsening abdominal pain, vaginal bleeding, or any other concerns.     Vital Signs Last 24 Hrs  T(C): 36.4 (09 Sep 2017 11:32), Max: 37.3 (08 Sep 2017 21:48)  T(F): 97.5 (09 Sep 2017 11:32), Max: 99.1 (08 Sep 2017 21:48)  HR: 96 (09 Sep 2017 11:32) (96 - 97)  BP: 103/49 (09 Sep 2017 11:32) (103/49 - 123/66)  RR: 16 (09 Sep 2017 11:32) (16 - 17)  SpO2: 96% (09 Sep 2017 11:32) (96% - 100%)    Gen: A&O x 3, NAD  Chest: CTA B/L  Cardiac: S1,S2  RRR  Abdomen: +BS; soft; moderate tenderness to deep palpation. nondistended  Gyn: no vaginal bleeding   Extremities: Nontender, no worsening edema                          8.5    10.9  )-----------( 149      ( 09 Sep 2017 05:48 )             26.5     09-09    143  |  110<H>  |  12  ----------------------------<  98  3.6   |  27  |  0.62    Ca    7.7<L>      09 Sep 2017 05:48    TPro  7.4  /  Alb  4.0  /  TBili  0.3  /  DBili  x   /  AST  15  /  ALT  18  /  AlkPhos  66  09-07

## 2017-09-09 NOTE — PROGRESS NOTE ADULT - ASSESSMENT
A/P: HD #3  with suspected ovarian cyst rupture, improving  -cont pain management  -follow up cbc 9/10/17  - keep npo  -oob, encourage ambulation  - pt seen and evaluated at bedside by dr. olanescu  -if cbc remains same, possible for discharge tomorrow  armaan

## 2017-09-10 VITALS
SYSTOLIC BLOOD PRESSURE: 107 MMHG | TEMPERATURE: 98 F | OXYGEN SATURATION: 96 % | HEART RATE: 89 BPM | DIASTOLIC BLOOD PRESSURE: 42 MMHG | RESPIRATION RATE: 16 BRPM

## 2017-09-10 LAB
HCT VFR BLD CALC: 25.1 % — LOW (ref 34.5–45)
HCT VFR BLD CALC: 27.5 % — LOW (ref 34.5–45)
HGB BLD-MCNC: 8.1 G/DL — LOW (ref 11.5–15.5)
HGB BLD-MCNC: 8.9 G/DL — LOW (ref 11.5–15.5)
MCHC RBC-ENTMCNC: 26.9 PG — LOW (ref 27–34)
MCHC RBC-ENTMCNC: 27 PG — SIGNIFICANT CHANGE UP (ref 27–34)
MCHC RBC-ENTMCNC: 32.3 GM/DL — SIGNIFICANT CHANGE UP (ref 32–36)
MCHC RBC-ENTMCNC: 32.3 GM/DL — SIGNIFICANT CHANGE UP (ref 32–36)
MCV RBC AUTO: 83.4 FL — SIGNIFICANT CHANGE UP (ref 80–100)
MCV RBC AUTO: 83.5 FL — SIGNIFICANT CHANGE UP (ref 80–100)
PLATELET # BLD AUTO: 138 K/UL — LOW (ref 150–400)
PLATELET # BLD AUTO: 156 K/UL — SIGNIFICANT CHANGE UP (ref 150–400)
RBC # BLD: 3.01 M/UL — LOW (ref 3.8–5.2)
RBC # BLD: 3.29 M/UL — LOW (ref 3.8–5.2)
RBC # FLD: 13.8 % — SIGNIFICANT CHANGE UP (ref 10.3–14.5)
RBC # FLD: 14.1 % — SIGNIFICANT CHANGE UP (ref 10.3–14.5)
WBC # BLD: 11.7 K/UL — HIGH (ref 3.8–10.5)
WBC # BLD: 9.2 K/UL — SIGNIFICANT CHANGE UP (ref 3.8–10.5)
WBC # FLD AUTO: 11.7 K/UL — HIGH (ref 3.8–10.5)
WBC # FLD AUTO: 9.2 K/UL — SIGNIFICANT CHANGE UP (ref 3.8–10.5)

## 2017-09-10 PROCEDURE — 86901 BLOOD TYPING SEROLOGIC RH(D): CPT

## 2017-09-10 PROCEDURE — 80053 COMPREHEN METABOLIC PANEL: CPT

## 2017-09-10 PROCEDURE — 76705 ECHO EXAM OF ABDOMEN: CPT

## 2017-09-10 PROCEDURE — 74177 CT ABD & PELVIS W/CONTRAST: CPT

## 2017-09-10 PROCEDURE — 80048 BASIC METABOLIC PNL TOTAL CA: CPT

## 2017-09-10 PROCEDURE — 96374 THER/PROPH/DIAG INJ IV PUSH: CPT | Mod: XU

## 2017-09-10 PROCEDURE — 81025 URINE PREGNANCY TEST: CPT

## 2017-09-10 PROCEDURE — 99285 EMERGENCY DEPT VISIT HI MDM: CPT | Mod: 25

## 2017-09-10 PROCEDURE — 86850 RBC ANTIBODY SCREEN: CPT

## 2017-09-10 PROCEDURE — 85027 COMPLETE CBC AUTOMATED: CPT

## 2017-09-10 PROCEDURE — 83690 ASSAY OF LIPASE: CPT

## 2017-09-10 PROCEDURE — 96376 TX/PRO/DX INJ SAME DRUG ADON: CPT

## 2017-09-10 PROCEDURE — 36415 COLL VENOUS BLD VENIPUNCTURE: CPT

## 2017-09-10 PROCEDURE — 76856 US EXAM PELVIC COMPLETE: CPT

## 2017-09-10 PROCEDURE — 96375 TX/PRO/DX INJ NEW DRUG ADDON: CPT

## 2017-09-10 PROCEDURE — 84702 CHORIONIC GONADOTROPIN TEST: CPT

## 2017-09-10 PROCEDURE — 76830 TRANSVAGINAL US NON-OB: CPT

## 2017-09-10 PROCEDURE — 81001 URINALYSIS AUTO W/SCOPE: CPT

## 2017-09-10 RX ORDER — DOCUSATE SODIUM 100 MG
1 CAPSULE ORAL
Qty: 60 | Refills: 0 | OUTPATIENT
Start: 2017-09-10 | End: 2017-10-10

## 2017-09-10 RX ORDER — ASCORBIC ACID 60 MG
1 TABLET,CHEWABLE ORAL
Qty: 30 | Refills: 2 | OUTPATIENT
Start: 2017-09-10 | End: 2017-12-08

## 2017-09-10 RX ORDER — IBUPROFEN 200 MG
1 TABLET ORAL
Qty: 40 | Refills: 0 | OUTPATIENT
Start: 2017-09-10 | End: 2017-09-20

## 2017-09-10 RX ORDER — FERROUS SULFATE 325(65) MG
1 TABLET ORAL
Qty: 90 | Refills: 2 | OUTPATIENT
Start: 2017-09-10 | End: 2017-12-08

## 2017-09-10 RX ADMIN — OXYCODONE AND ACETAMINOPHEN 2 TABLET(S): 5; 325 TABLET ORAL at 08:44

## 2017-09-10 RX ADMIN — OXYCODONE AND ACETAMINOPHEN 1 TABLET(S): 5; 325 TABLET ORAL at 00:30

## 2017-09-10 RX ADMIN — OXYCODONE AND ACETAMINOPHEN 2 TABLET(S): 5; 325 TABLET ORAL at 17:10

## 2017-09-10 RX ADMIN — OXYCODONE AND ACETAMINOPHEN 1 TABLET(S): 5; 325 TABLET ORAL at 15:15

## 2017-09-10 RX ADMIN — OXYCODONE AND ACETAMINOPHEN 1 TABLET(S): 5; 325 TABLET ORAL at 04:48

## 2017-09-10 RX ADMIN — SIMETHICONE 80 MILLIGRAM(S): 80 TABLET, CHEWABLE ORAL at 13:03

## 2017-09-10 RX ADMIN — OXYCODONE AND ACETAMINOPHEN 1 TABLET(S): 5; 325 TABLET ORAL at 05:45

## 2017-09-10 NOTE — PROGRESS NOTE ADULT - PROBLEM SELECTOR PLAN 1
1. advance diet as tolerated   2. prn pain control   3. may follow up as outpatient for cholecystectomy  for cholelithiasis
repeat cbc at 10am as hemoglobin trending down  abdomen benign, advance to regular diet  continue pain management prn and routine monitoring  possible dc home today if cbc stable
suspected ruptured ovarian cyst vs cholelithiasis  awaiting sonogram of RUQ  continue pain management  repeat cbc/cmp/amylase/lipase in am  continue NPO until results reviewed  d/w Dr. Rajput

## 2017-09-10 NOTE — DISCHARGE NOTE ADULT - HOSPITAL COURSE
Pt admitted to ED for pain management of RLQ pain suspected ruptured ovarian cyst   h/o cholielithiasis, seen by surgery for consultation and recommended outpatient follow up with GI  pain control

## 2017-09-10 NOTE — DISCHARGE NOTE ADULT - CARE PROVIDER_API CALL
Deborah Chester), Obstetrics and Gynecology  [address] [place] [id number]  Phone: [phone]  Fax: [phone]

## 2017-09-10 NOTE — PROGRESS NOTE ADULT - SUBJECTIVE AND OBJECTIVE BOX
Patient evaluated at bedside, offers no acute complaints. States the pain has greatly improved.  Currently NPO for procedure , Voiding without diffficulty; +flatus, -BM  Denies fever/chills, nausea/vomiting, headache, dizziness, chest pains, shortness of breath, palpitations    Vital Signs Last 24 Hrs  T(C): 36.4 (10 Sep 2017 06:15), Max: 37.4 (09 Sep 2017 22:17)  T(F): 97.6 (10 Sep 2017 06:15), Max: 99.3 (09 Sep 2017 22:17)  HR: 85 (10 Sep 2017 06:15) (83 - 96)  BP: 107/49 (10 Sep 2017 06:15) (103/49 - 126/57)  BP(mean): --  RR: 16 (10 Sep 2017 06:15) (16 - 17)  SpO2: 100% (10 Sep 2017 06:15) (96% - 100%)    PE: Pt appears comfortable, NAD, AAOx3  Chest: CTA bilaterally, no W/R/R  Cardiac: RRR  Abd: soft; NT; no guarding or rebound; +BS x4 quad  Gyn: no vaginal bleeding  Ext: soft, NT; DTRs 2+ bilaterally; no worsening edema                          8.1    9.2   )-----------( 138      ( 10 Sep 2017 06:18 )             25.1     09-09    143  |  110<H>  |  12  ----------------------------<  98  3.6   |  27  |  0.62    Ca    7.7<L>      09 Sep 2017 05:48      d/w Dr. Rajput

## 2017-09-10 NOTE — DISCHARGE NOTE ADULT - CARE PLAN
Principal Discharge DX:	Ruptured ovarian cyst  Goal:	pain control and monitoring  Instructions for follow-up, activity and diet:	Continue pain medications as needed as well as iron supplements for anemia  Follow up as scheduled with private Gyn for further management and follow up sonogram in 6 weeks  Return to ED if increased pain, vaginal bleeding, fever, vaginal discharge  Regular diet as tolerated  Secondary Diagnosis:	Cholelithiasis  Instructions for follow-up, activity and diet:	avoid fried, fatty foods  Increase water consumption  follow up with GI as recommended bu inpatient surgery

## 2017-09-10 NOTE — PROGRESS NOTE ADULT - ASSESSMENT
45yo female HD #4 admitted for pain mgmgt of suspected ruptured hemorrhagic cyst and cholelithiasis, doing well, improving with pain mgmt

## 2017-09-10 NOTE — DISCHARGE NOTE ADULT - PATIENT PORTAL LINK FT
“You can access the [facility] Patient Portal, offered by [facility], by registering with the following website: [email or web address]”

## 2017-09-10 NOTE — CHART NOTE - NSCHARTNOTEFT_GEN_A_CORE
Re-evaluated s/p repeat cbc  No acute complaints   Hemoglobin stable  d/w Dr. Rajput, cleared for discharge home   Pt states she already spoke to her private gyn in [place] and has scheduled appointment next week  Discharge instructions verbalized and understood

## 2017-09-10 NOTE — PROGRESS NOTE ADULT - PROBLEM SELECTOR PLAN 2
no acute process  if stable for dc home, will follow up with GI for further testing including HIDA scan

## 2017-09-10 NOTE — DISCHARGE NOTE ADULT - PLAN OF CARE
pain control and monitoring Continue pain medications as needed as well as iron supplements for anemia  Follow up as scheduled with private Gyn for further management and follow up sonogram in 6 weeks  Return to ED if increased pain, vaginal bleeding, fever, vaginal discharge  Regular diet as tolerated avoid fried, fatty foods  Increase water consumption  follow up with GI as recommended bu inpatient surgery

## 2017-09-10 NOTE — DISCHARGE NOTE ADULT - MEDICATION SUMMARY - MEDICATIONS TO TAKE
I will START or STAY ON the medications listed below when I get home from the hospital:    ibuprofen 600 mg oral tablet  -- 1 tab(s) by mouth every 6 hours, As Needed -for moderate pain   -- Do not take this drug if you are pregnant.  It is very important that you take or use this exactly as directed.  Do not skip doses or discontinue unless directed by your doctor.  May cause drowsiness or dizziness.  Obtain medical advice before taking any non-prescription drugs as some may affect the action of this medication.  Take with food or milk.    -- Indication: For moderate pain    ferrous sulfate 325 mg (65 mg elemental iron) oral tablet  -- 1 tab(s) by mouth 3 times a day   -- Check with your doctor before becoming pregnant.  Do not chew, break, or crush.  May discolor urine or feces.    -- Indication: For Anemia    Colace 100 mg oral capsule  -- 1 cap(s) by mouth 2 times a day, As Needed -for constipation   -- Medication should be taken with plenty of water.    -- Indication: For stool softener as needed    Acerola 500 mg oral tablet, chewable  -- 1 tab(s) by mouth once a day   -- Chew tablets before swallowing    -- Indication: For vitamin c I will START or STAY ON the medications listed below when I get home from the hospital:    ibuprofen 600 mg oral tablet  -- 1 tab(s) by mouth every 6 hours, As Needed -for moderate pain   -- Do not take this drug if you are pregnant.  It is very important that you take or use this exactly as directed.  Do not skip doses or discontinue unless directed by your doctor.  May cause drowsiness or dizziness.  Obtain medical advice before taking any non-prescription drugs as some may affect the action of this medication.  Take with food or milk.    -- Indication: For moderate pain    Percocet 5/325 oral tablet  -- 1 tab(s) by mouth every 6 hours, As Needed -for moderate pain MDD:4 tabs  -- Caution federal law prohibits the transfer of this drug to any person other  than the person for whom it was prescribed.  May cause drowsiness.  Alcohol may intensify this effect.  Use care when operating dangerous machinery.  This prescription cannot be refilled.  This product contains acetaminophen.  Do not use  with any other product containing acetaminophen to prevent possible liver damage.  Using more of this medication than prescribed may cause serious breathing problems.    -- Indication: For severe pain    ferrous sulfate 325 mg (65 mg elemental iron) oral tablet  -- 1 tab(s) by mouth 3 times a day   -- Check with your doctor before becoming pregnant.  Do not chew, break, or crush.  May discolor urine or feces.    -- Indication: For Anemia    Colace 100 mg oral capsule  -- 1 cap(s) by mouth 2 times a day, As Needed -for constipation   -- Medication should be taken with plenty of water.    -- Indication: For stool softener as needed    Acerola 500 mg oral tablet, chewable  -- 1 tab(s) by mouth once a day   -- Chew tablets before swallowing    -- Indication: For vitamin c

## 2017-09-10 NOTE — DISCHARGE NOTE ADULT - ADDITIONAL INSTRUCTIONS
Continue pain medications as needed as well as iron supplements for anemia  Follow up as scheduled with private Gyn for further management and follow up sonogram in 6 weeks  Return to ED if increased pain, vaginal bleeding, fever, vaginal discharge  Increase water consumption  follow up with GI as recommended bu inpatient surgery  Regular diet as tolerated

## 2017-09-10 NOTE — PROGRESS NOTE ADULT - PROBLEM SELECTOR PROBLEM 1
Calculus of gallbladder without cholecystitis without obstruction
Ruptured ovarian cyst
Abdominal pain

## 2017-09-10 NOTE — DISCHARGE NOTE ADULT - CONDITIONS AT DISCHARGE
Pt AOx3 breathing unlabored no c/p resp. distress chest pain or SOB. pt with Dx. of Ruptured Ovarian Cyst no surgery noted Pt to f/u with outpatient GYN  Abdomen soft tender to touch obese in size non distended BS present in all 4 quadrants Pt tolerating diet denies any N/V. Cap refill less than 3 seconds pulses present in all extremities pt with positive sensation and mobility no neurovascular deficit noted. Pt OOB ambulating voiding without any difficulties. Vital signs stable. pt for DC home today verbalizes understanding and agreement with DC. Vital signs stable no distress noted All needs of pt met.

## 2022-06-28 NOTE — ED ADULT TRIAGE NOTE - NS ED NOTE AC HIGH RISK COUNTRIES
No Winlevi Pregnancy And Lactation Text: This medication is considered safe during pregnancy and breastfeeding.

## 2025-07-07 NOTE — ED PROVIDER NOTE - DIAGNOSIS COUNSELING, MDM
Physical Therapy Visit    Visit Type: Daily Treatment Note  Visit: 3  Referring Provider: Marie Hutchins CNP  Medical Diagnosis (from order): M54.50 - Lumbar pain     SUBJECTIVE                                                                                                               \"Today is better - was sore the other day - not too bad after last physical therapy visit\".      OBJECTIVE                                                                                                                     Range of Motion (ROM)   (degrees unless noted; active unless noted; norms in ( ); negative=lacking to 0, positive=beyond 0)  Lumbar:    - Flexion (60-80):  WNL     - Extension (25):  WNL     - Rotation (30-45):         Left:  WNL          Right:  WNL     - Side Bend (25-35):         Left:  WNL          Right:  WNL          Palpation  Lumbar  - L3-L4: - Right: tenderness  - L4-L5: - Right: tenderness  Joint Play   Lumbar   - L4-L5: hypomobile and pain  - L5-S1: hypomobile and pain                 Treatment     Therapeutic Exercise  Reviewed previous home exercise program as described below // no cues for previous - performs perfectly.     Elliptical x 5 minutes at L1 // no difficulty    Added side planks      Performed to develop strength, increase endurance, and reduce pain for increased independence and improved functional mobility with return to prior level of function      Manual Therapy   Grade II-III central p-a to L3-5 and transverse left to right of same both in prone // transverse processes on right are too irritable to mobilize directly at this time - results were improved right rotation to full with subjectively decreased symptoms       Performed in order to address range of motion deficits, capsular restrictions, and pain/discomfort for improved mobility and increased independence with standing tolerance      Skilled input: verbal instruction/cues, tactile instruction/cues and as detailed above    Writer  verbally educated and received verbal consent for hand placement, positioning of patient, and techniques to be performed today from patient for clothing adjustments for techniques, hand placement and palpation for techniques and therapist position for techniques as described above and how they are pertinent to the patient's plan of care.  Home Exercise Program  Access Code: [email or web address]  URL: [email or web address]  Date: 07/03/2025  Prepared by: Sebas Caruso    Exercises  - Supine Posterior Pelvic Tilt  - 1 x daily - 7 x weekly - 1 sets - 10 reps  - Supine Transversus Abdominis Bracing - Hands on Stomach  - 1 x daily - 7 x weekly - 1 sets - 10 reps  - Supine Bridge  - 2 x daily - 7 x weekly - 1 sets - 10 reps - 5 sec hold  - Supine Bridge with Leg Extension  - 2 x daily - 7 x weekly - 1 sets - 10 reps - 5 sec hold  - Plank on Knees  - 2 x daily - 7 x weekly - 1 sets - 3 reps - 30 hold      ASSESSMENT                                                                                                            Patient demonstrates improved trunk ROM, decreased symptoms and irritability and improving functional movement. Still has pain with mobilization of right lateral L4 area primarily but not as intense as previously. Demonstrates understanding and correct performance of home exercise program. Will continue progression towards being able to pass fitness test at [facility] start of Aug.   Education:   - Results of above outlined education: Verbalizes understanding, Demonstrates understanding and Needs reinforcement    PLAN                                                                                                                           Suggestions for next session as indicated: Progress per plan of care      Progress stabilization; reassess trunk ROM              Therapy procedure time and total treatment time can be found documented on the Time Entry flowsheet     conducted a detailed discussion... I had a detailed discussion with the patient and/or guardian regarding the historical points, exam findings, and any diagnostic results supporting the discharge/admit diagnosis.